# Patient Record
Sex: MALE | Race: WHITE
[De-identification: names, ages, dates, MRNs, and addresses within clinical notes are randomized per-mention and may not be internally consistent; named-entity substitution may affect disease eponyms.]

---

## 2021-11-24 ENCOUNTER — HOSPITAL ENCOUNTER (EMERGENCY)
Dept: HOSPITAL 33 - ED | Age: 41
Discharge: HOME | End: 2021-11-24
Payer: COMMERCIAL

## 2021-11-24 VITALS — DIASTOLIC BLOOD PRESSURE: 69 MMHG | SYSTOLIC BLOOD PRESSURE: 114 MMHG | OXYGEN SATURATION: 96 % | HEART RATE: 78 BPM

## 2021-11-24 DIAGNOSIS — U07.1: Primary | ICD-10-CM

## 2021-11-24 DIAGNOSIS — R05.9: ICD-10-CM

## 2021-11-24 DIAGNOSIS — R50.9: ICD-10-CM

## 2021-11-24 DIAGNOSIS — J12.82: ICD-10-CM

## 2021-11-24 DIAGNOSIS — R06.02: ICD-10-CM

## 2021-11-24 DIAGNOSIS — J12.81: ICD-10-CM

## 2021-11-24 DIAGNOSIS — R51.9: ICD-10-CM

## 2021-11-24 DIAGNOSIS — I10: ICD-10-CM

## 2021-11-24 DIAGNOSIS — Z79.84: ICD-10-CM

## 2021-11-24 DIAGNOSIS — E11.8: ICD-10-CM

## 2021-11-24 DIAGNOSIS — E78.5: ICD-10-CM

## 2021-11-24 LAB
ALBUMIN SERPL-MCNC: 4.3 G/DL (ref 3.5–5)
ALP SERPL-CCNC: 43 U/L (ref 38–126)
ALT SERPL-CCNC: 31 U/L (ref 0–50)
ANION GAP SERPL CALC-SCNC: 18.3 MEQ/L (ref 5–15)
AST SERPL QL: 38 U/L (ref 17–59)
BILIRUB BLD-MCNC: 0.8 MG/DL (ref 0.2–1.3)
BNP SERPL-MCNC: 13.6 PG/ML (ref 0–450)
BUN SERPL-MCNC: 23 MG/DL (ref 9–20)
CALCIUM SPEC-MCNC: 8.6 MG/DL (ref 8.4–10.2)
CELLS COUNTED: 100
CHLORIDE SERPL-SCNC: 92 MMOL/L (ref 98–107)
CO2 SERPL-SCNC: 25 MMOL/L (ref 22–30)
CREAT SERPL-MCNC: 1.05 MG/DL (ref 0.66–1.25)
GFR SERPLBLD BASED ON 1.73 SQ M-ARVRAT: > 60 ML/MIN
GLUCOSE SERPL-MCNC: 128 MG/DL (ref 74–106)
HCT VFR BLD AUTO: 42 % (ref 42–50)
HGB BLD-MCNC: 14.3 GM/DL (ref 12.5–18)
MAGNESIUM SERPL-MCNC: 1.9 MG/DL (ref 1.6–2.3)
MANUAL DIF COMMENT BLD-IMP: NORMAL
MCH RBC QN AUTO: 29.9 PG (ref 26–32)
MCHC RBC AUTO-ENTMCNC: 34 G/DL (ref 32–36)
PLATELET # BLD AUTO: 158 K/MM3 (ref 150–450)
POTASSIUM SERPLBLD-SCNC: 3.8 MMOL/L (ref 3.5–5.1)
PROT SERPL-MCNC: 7.3 G/DL (ref 6.3–8.2)
RBC # BLD AUTO: 4.78 M/MM3 (ref 4.1–5.6)
SODIUM SERPL-SCNC: 132 MMOL/L (ref 137–145)
WBC # BLD AUTO: 3.8 K/MM3 (ref 4–10.5)

## 2021-11-24 PROCEDURE — 99284 EMERGENCY DEPT VISIT MOD MDM: CPT

## 2021-11-24 PROCEDURE — 36415 COLL VENOUS BLD VENIPUNCTURE: CPT

## 2021-11-24 PROCEDURE — 85379 FIBRIN DEGRADATION QUANT: CPT

## 2021-11-24 PROCEDURE — 84484 ASSAY OF TROPONIN QUANT: CPT

## 2021-11-24 PROCEDURE — 71045 X-RAY EXAM CHEST 1 VIEW: CPT

## 2021-11-24 PROCEDURE — 83735 ASSAY OF MAGNESIUM: CPT

## 2021-11-24 PROCEDURE — 93005 ELECTROCARDIOGRAM TRACING: CPT

## 2021-11-24 PROCEDURE — 83605 ASSAY OF LACTIC ACID: CPT

## 2021-11-24 PROCEDURE — 83880 ASSAY OF NATRIURETIC PEPTIDE: CPT

## 2021-11-24 PROCEDURE — 80053 COMPREHEN METABOLIC PANEL: CPT

## 2021-11-24 PROCEDURE — 85025 COMPLETE CBC W/AUTO DIFF WBC: CPT

## 2021-11-24 PROCEDURE — 87040 BLOOD CULTURE FOR BACTERIA: CPT

## 2021-11-24 NOTE — XRAY
Indication: Cough and short of breath.



Comparison: None



Portable chest demonstrates subtle bilateral mid to lower lung patchy

interstitial alveolar opacities.  Remaining heart, lungs, and bony thorax

unremarkable.

## 2021-11-24 NOTE — ERPHSYRPT
- History of Present Illness


Time Seen by Provider: 11/24/21 13:09


Source: patient


Exam Limitations: no limitations


Patient Subjective Stated Complaint: pt here for sob and cough. pt was dx with 

covid 8 days ago,


Triage Nursing Assessment: pt alert, resp easy, skin w/d/p, no cough at persent 

time, no edema , face mask in place


Physician History: 





41-year-old with history of hypertension, diabetes mellitus, unvaccinated 

against COVID-19, tested positive almost a week ago presented in the ER with 

worsening coughing spell and shortness of breath since morning. Patient was 

evaluated yesterday and received infusion outpatient. Reports having off-and-on 

low-grade fever with a T-max of 101. Reports getting short of breath with 

activity and sometimes at resting with oxygen saturations ranging around 93%. 

Also having some chest tightness wheezing along with body aches, headache and 

getting lightheaded.


Timing/Duration: day(s) (8), constant, gradual onset, worse


Cough Quality/Degree: moderate, dry cough


Possible Cause: no prior episodes


Modifying Factors: Improves With: rest.  Worsens With: coughing, deep breath, e

xertion


Associated Symptoms: fever, chills, chest pain/soreness, cough, headache, 

lightheadedness, muscle aches, nasal congestion, shortness of breath


Allergies/Adverse Reactions: 








No Known Drug Allergies Allergy (Unverified 11/24/21 13:19)


   





Home Medications: 








Glimepiride 2 mg*** [Amaryl 2 MG***] 2 mg PO DAILY 11/24/21 [History]


Lisinopril/Hydrochlorothiazide [Lisinopril-Hctz 20-25 mg Tab] 1 ea DAILY 

11/24/21 [History]


Metformin HCl 500 mg*** [Glucophage 500 MG***] 1 ea DAILY 11/24/21 [History]


Simvastatin 20Mg** [Zocor 20Mg**] 1 ea DAILY 11/24/21 [History]





Hx Tetanus, Diphtheria Vaccination/Date Given: No


Hx Influenza Vaccination/Date Given: No


Hx Pneumococcal Vaccination/Date Given: No


Immunizations Up to Date: Yes





Travel Risk





- International Travel


Have you traveled outside of the country in past 3 weeks: No





- Coronavirus Screening


Are you exhibiting any of the following symptoms?: Yes


Symptoms: Fever, Cough: New Onset, Shortness of Breath


Close contact with a COVID-19 positive Pt in past 14-21 Days: Yes





- Vaccine Status


Have you recieved a Covid-19 vaccination: No





- Review of Systems


Constitutional: Fever, Chills, Fatigue, Weakness


Eyes: No Symptoms


Ears, Nose, & Throat: Nose Congestion


Respiratory: Cough, Dyspnea, Dyspnea on Exertion (BROOKS), Wheezing


Cardiac: Chest Pain


Abdominal/Gastrointestinal: No Symptoms


Genitourinary Symptoms: No Symptoms


Musculoskeletal: Myalgias


Skin: No Symptoms


Neurological: Dizziness, Headache


Psychological: No Symptoms


Endocrine: No Symptoms


Hematologic/Lymphatic: No Symptoms


Immunological/Allergic: No Symptoms





- Past Medical History


Pertinent Past Medical History: Yes


Cardiac History: High Cholesterol, Hypertension


Endocrine Medical History: Diabetes Type II





- Past Surgical History


Past Surgical History: Yes





- Social History


Smoking Status: Never smoker


Exposure to second hand smoke: No


Drug Use: none


Patient Lives Alone: No





- Nursing Vital Signs


Nursing Vital Signs: 


                               Initial Vital Signs











Respiratory Rate  22   11/24/21 13:04


 


O2 Sat by Pulse Oximetry  94 L  11/24/21 13:04








                                   Pain Scale











Pain Intensity                 0

















- Physical Exam


General Appearance: no apparent distress, alert, anxiety


Eye Exam: PERRL/EOMI, eyes nml inspection


Ears, Nose, Throat Exam: moist mucous membranes, pharyngeal erythema


Neck Exam: normal inspection, non-tender, supple, full range of motion, No 

meningismus


Respiratory Exam: diminished breath sounds, wheezing


Cardiovascular Exam: regular rate/rhythm, normal heart sounds


Gastrointestinal/Abdomen Exam: soft, normal bowel sounds


Back Exam: normal inspection, normal range of motion


Extremity Exam: normal inspection, normal range of motion, pelvis stable


Neurologic Exam: alert, oriented x 3, cooperative, CNs II-XII nml as tested


Skin Exam: normal color


**SpO2 Interpretation**: normal


SpO2: 94


O2 Delivery: Room Air





- Course


EKG Interpreted by Me: RATE ([id number]), Sinus Rhythm, NORMAL AXIS, NORMAL INTERVALS, 

NORMAL QRS


Ordered Tests: 


                               Active Orders 24 hr











 Category Date Time Status


 


 EKG-ER Only STAT Care  11/24/21 13:34 Active


 


 CHEST 1 VIEW (PORTABLE) Stat Exams  11/24/21 13:34 Completed


 


 BLOOD CULTURE Stat Lab  11/24/21 13:53 Received


 


 CBC W DIFF Stat Lab  11/24/21 13:53 Completed


 


 CMP Stat Lab  11/24/21 13:53 Completed


 


 D-DIMER QUANTITATIVE Stat Lab  11/24/21 13:15 Completed


 


 Lactic Acid Stat Lab  11/24/21 13:34 Completed


 


 MAGNESIUM Stat Lab  11/24/21 13:53 Completed


 


 Manual Differential NC Stat Lab  11/24/21 13:53 Completed


 


 NT PRO BNP Stat Lab  11/24/21 13:53 Completed


 


 TROPONIN Q3H Lab  11/24/21 13:53 Completed


 


 TROPONIN Q3H Lab  11/24/21 16:45 Ordered


 


 TROPONIN Q3H Lab  11/24/21 19:45 Ordered


 


 TROPONIN Q3H Lab  11/24/21 22:45 Ordered


 


 TROPONIN Q3H Lab  11/25/21 01:45 Ordered











Lab/Rad Data: 


                           Laboratory Result Diagrams





                                 11/24/21 13:53 





                                 11/24/21 13:53 





                               Laboratory Results











  11/24/21 11/24/21 11/24/21 Range/Units





  13:53 13:53 13:53 


 


WBC    3.8 L  (4.0-10.5)  K/mm3


 


RBC    4.78  (4.1-5.6)  M/mm3


 


Hgb    14.3  (12.5-18.0)  gm/dl


 


Hct    42.0  (42-50)  %


 


MCV    87.9  ()  fl


 


MCH    29.9  (26-32)  pg


 


MCHC    34.0  (32-36)  g/dl


 


RDW    12.5  (11.5-14.0)  %


 


Plt Count    158  (150-450)  K/mm3


 


MPV    10.3  (7.5-11.0)  fl


 


Segmented Neutrophils    49  (36.-66.)  %


 


Lymphocytes (Manual)    39  (24-44)  %


 


Monocytes (Manual)    12  (0.0-12.0)  %


 


Platelet Estimate    NORMAL  (NORMAL)  


 


RBC Morphology    NORMAL  


 


D-Dimer     (215-500)  ng/mL


 


Sodium   132 L   (137-145)  mmol/L


 


Potassium   3.8   (3.5-5.1)  mmol/L


 


Chloride   92 L   ()  mmol/L


 


Carbon Dioxide   25   (22-30)  mmol/L


 


Anion Gap   18.3 H   (5-15)  MEQ/L


 


BUN   23 H   (9-20)  mg/dL


 


Creatinine   1.05   (0.66-1.25)  mg/dL


 


Estimated GFR   > 60.0   ML/MIN


 


Glucose   128 H   ()  mg/dL


 


Lactic Acid     (0.4-2.0)  


 


Calcium   8.6   (8.4-10.2)  mg/dL


 


Magnesium   1.9   (1.6-2.3)  mg/dL


 


Total Bilirubin   0.80   (0.2-1.3)  mg/dL


 


AST   38   (17-59)  U/L


 


ALT   31   (0-50)  U/L


 


Alkaline Phosphatase   43   ()  U/L


 


Troponin I  < 0.012    (0.000-0.034)  ng/mL


 


NT-Pro-B Natriuret Pep   13.6   (0-450)  pg/mL


 


Serum Total Protein   7.3   (6.3-8.2)  g/dL


 


Albumin   4.3   (3.5-5.0)  g/dL














  11/24/21 11/24/21 Range/Units





  13:34 13:15 


 


WBC    (4.0-10.5)  K/mm3


 


RBC    (4.1-5.6)  M/mm3


 


Hgb    (12.5-18.0)  gm/dl


 


Hct    (42-50)  %


 


MCV    ()  fl


 


MCH    (26-32)  pg


 


MCHC    (32-36)  g/dl


 


RDW    (11.5-14.0)  %


 


Plt Count    (150-450)  K/mm3


 


MPV    (7.5-11.0)  fl


 


Segmented Neutrophils    (36.-66.)  %


 


Lymphocytes (Manual)    (24-44)  %


 


Monocytes (Manual)    (0.0-12.0)  %


 


Platelet Estimate    (NORMAL)  


 


RBC Morphology    


 


D-Dimer   315  (215-500)  ng/mL


 


Sodium    (137-145)  mmol/L


 


Potassium    (3.5-5.1)  mmol/L


 


Chloride    ()  mmol/L


 


Carbon Dioxide    (22-30)  mmol/L


 


Anion Gap    (5-15)  MEQ/L


 


BUN    (9-20)  mg/dL


 


Creatinine    (0.66-1.25)  mg/dL


 


Estimated GFR    ML/MIN


 


Glucose    ()  mg/dL


 


Lactic Acid  1.4   (0.4-2.0)  


 


Calcium    (8.4-10.2)  mg/dL


 


Magnesium    (1.6-2.3)  mg/dL


 


Total Bilirubin    (0.2-1.3)  mg/dL


 


AST    (17-59)  U/L


 


ALT    (0-50)  U/L


 


Alkaline Phosphatase    ()  U/L


 


Troponin I    (0.000-0.034)  ng/mL


 


NT-Pro-B Natriuret Pep    (0-450)  pg/mL


 


Serum Total Protein    (6.3-8.2)  g/dL


 


Albumin    (3.5-5.0)  g/dL














- Progress


Progress: re-examined


Air Movement: good


Progress Note: 





11/24/21 15:45


41-year-old with Covid positive who received monoclonal antibody infusion 

yesterday presented with increasing cough and shortness of breath.  Patient 

oxygen saturation is around 96/97% on room air.  No obvious difficulty breathing

 or distress.  X-rays showed finding consistent with bilateral interstitial 

airspace disease/opacities without any consolidation.  Negative work-up 

otherwise including EKG/troponin/D-dimer.  Patient is very anxious.  He is 

counseled.  I would give him inhaler and Z-Zana to go home.  Recommended 

outpatient follow-up.  Discussed signs symptoms of worsening needing return to 

ER which he seems understanding.


Blood Culture(s) Obtained: No


Antibiotics given: Yes


Counseled pt/family regarding: lab results, diagnosis, need for follow-up, rad 

results





- Departure


Departure Disposition: Home


Clinical Impression: 


 Pneumonia due to COVID-19 virus





Condition: Stable


Critical Care Time: No


Referrals: 


RAZIA NEVILLE MD [Primary Care Provider] - Follow up/PCP as directed (In 2

days for reevaluation)


Instructions:  Cough, Adult (DC), Coronavirus Disease 2019 (COVID-19) (DC)


Additional Instructions: 


Use inhaler as needed for shortness of breath.  Continue with contact/droplet 

precautions until your current plan is over.  Return to ER for increasing 

shortness of breath/persistent fevers/worsening cough/chest pain etc.


Prescriptions: 


Albuterol 8 gm Mdi Hfa*** [Ventolin Hfa MDI***] 8 gm IH Q4H #1 inh


Azithromycin 250 mg*** [Zithromax 250 MG TABLET***] 250 mg PO ZPACK #6 tablet